# Patient Record
Sex: MALE | RURAL
[De-identification: names, ages, dates, MRNs, and addresses within clinical notes are randomized per-mention and may not be internally consistent; named-entity substitution may affect disease eponyms.]

---

## 2019-09-12 VITALS — HEIGHT: 73 IN

## 2019-09-12 RX ORDER — CITALOPRAM 20 MG/1
20 TABLET ORAL DAILY
COMMUNITY
End: 2019-11-22 | Stop reason: DRUGHIGH

## 2019-09-13 ENCOUNTER — OFFICE VISIT (OUTPATIENT)
Dept: CARDIOLOGY | Facility: CLINIC | Age: 29
End: 2019-09-13

## 2019-09-13 VITALS
HEIGHT: 73 IN | HEART RATE: 72 BPM | DIASTOLIC BLOOD PRESSURE: 88 MMHG | BODY MASS INDEX: 35.25 KG/M2 | WEIGHT: 266 LBS | SYSTOLIC BLOOD PRESSURE: 140 MMHG

## 2019-09-13 DIAGNOSIS — I48.0 PAROXYSMAL ATRIAL FIBRILLATION (HCC): Primary | ICD-10-CM

## 2019-09-13 PROBLEM — I48.91 ATRIAL FIBRILLATION (HCC): Status: ACTIVE | Noted: 2019-09-13

## 2019-09-13 PROBLEM — S62.002P: Status: ACTIVE | Noted: 2017-10-24

## 2019-09-13 PROBLEM — F41.9 ANXIETY: Status: ACTIVE | Noted: 2019-09-13

## 2019-09-13 PROCEDURE — 93000 ELECTROCARDIOGRAM COMPLETE: CPT | Performed by: INTERNAL MEDICINE

## 2019-09-13 PROCEDURE — 99204 OFFICE O/P NEW MOD 45 MIN: CPT | Performed by: INTERNAL MEDICINE

## 2019-09-13 RX ORDER — DICLOFENAC SODIUM 75 MG/1
TABLET, DELAYED RELEASE ORAL
COMMUNITY
Start: 2019-09-09 | End: 2019-11-22

## 2019-09-13 RX ORDER — OXYCODONE HYDROCHLORIDE AND ACETAMINOPHEN 5; 325 MG/1; MG/1
TABLET ORAL
COMMUNITY
Start: 2019-09-11 | End: 2019-11-22

## 2019-09-13 RX ORDER — KETOROLAC TROMETHAMINE 10 MG/1
TABLET, FILM COATED ORAL
COMMUNITY
Start: 2019-09-09 | End: 2019-11-22

## 2019-09-13 RX ORDER — ONDANSETRON 4 MG/1
TABLET, FILM COATED ORAL
COMMUNITY
Start: 2019-09-09 | End: 2019-11-22

## 2019-09-13 NOTE — PROGRESS NOTES
Subjective:     Encounter Date:09/13/2019      Patient ID: Toni Woodall is a 29 y.o. male.    Chief Complaint:  History of Present Illness    This is a 29-year-old with a history of anxiety, who presents for an evaluation of atrial fibrillation.    The patient was undergoing hip surgery on 9/11/2019 when he apparently developed bradycardia with heart rates in the 40s after hip cannula was placed intraoperatively.  He was given ephedrine at that point and his heart rates bob to the 80s was reportedly junctional rhythm with PACs.  Later on it was noted that he appeared to be in atrial fibrillation.  It is unclear what his heart rates were but it appears based on the anesthesia note that it ranged anywhere from the 100s to 130s.  He was given 2 doses of esmolol followed by IV metoprolol to 120s.  An EKG was performed that confirmed atrial fibrillation.  The patient was discharged postoperatively with a prescription for metoprolol  and plans for cardiology evaluation as an outpatient.      They ended up not giving him any of the metoprolol because when he got home that night his blood pressures were noted to be relatively low with systolics in the 100s.  Yesterday he reports that he had palpitations throughout the day they gave him an uncomfortable feeling in his chest but he denied any associated shortness of breath, near-syncope or syncope.  His blood pressures were also normal when they were checked yesterday.  Today he has not had any palpitations but reports brief episodes of chest tightness lasting a few seconds at a time this morning.  He currently does not have it the symptoms at this time.      He does report a history of anxiety and when he is feeling anxious develops shaking associated with heart racing.  Those symptoms feel different from what he was feeling yesterday.  He denies any history of diabetes or family history of heart disease in his immediate family.  He reports that he had some issues with  elevated blood pressures earlier in the year but this improved after he stopped drinking energy drinks.  He was started on citalopram earlier this year for his anxiety which helped for a couple of months but now his symptoms are back to where they were earlier.  He has not been back to seeing Dr. Maldonado for follow-up.  He does admit that his sleep schedule is off because he normally works swing shift.  Following his recent surgery he is to be off for the next 12 weeks.    Review of Systems   Constitution: Positive for malaise/fatigue. Negative for weakness.   HENT: Negative for hearing loss, hoarse voice, nosebleeds and sore throat.    Eyes: Negative for pain.   Cardiovascular: Positive for chest pain and palpitations. Negative for claudication, cyanosis, dyspnea on exertion, irregular heartbeat, leg swelling, near-syncope, orthopnea, paroxysmal nocturnal dyspnea and syncope.   Respiratory: Negative for shortness of breath and snoring.    Endocrine: Negative for cold intolerance, heat intolerance, polydipsia, polyphagia and polyuria.   Skin: Negative for itching and rash.   Musculoskeletal: Negative for arthritis, falls, joint pain, joint swelling, muscle cramps, muscle weakness and myalgias.   Gastrointestinal: Negative for constipation, diarrhea, dysphagia, heartburn, hematemesis, hematochezia, melena, nausea and vomiting.   Genitourinary: Negative for frequency, hematuria and hesitancy.   Neurological: Negative for excessive daytime sleepiness, dizziness, headaches, light-headedness and numbness.   Psychiatric/Behavioral: Negative for depression. The patient is nervous/anxious.           Current Outpatient Medications:   •  apixaban (ELIQUIS) 5 MG tablet tablet, Take 5 mg by mouth., Disp: , Rfl:   •  diclofenac (VOLTAREN) 75 MG EC tablet, , Disp: , Rfl:   •  ondansetron (ZOFRAN) 4 MG tablet, , Disp: , Rfl:   •  citalopram (CeleXA) 20 MG tablet, Take 20 mg by mouth Daily., Disp: , Rfl:   •  diclofenac (VOLTAREN)  "75 MG EC tablet, , Disp: , Rfl:   •  ketorolac (TORADOL) 10 MG tablet, , Disp: , Rfl:   •  metoprolol tartrate (LOPRESSOR) 25 MG tablet, , Disp: , Rfl:   •  oxyCODONE-acetaminophen (PERCOCET) 5-325 MG per tablet, , Disp: , Rfl:     Past Medical History:   Diagnosis Date   • Hip pain    • Scaphoid fracture      Past Surgical History:   Procedure Laterality Date   • NAIL BED REMOVAL/REVISION       History reviewed. No pertinent family history.  Social History     Tobacco Use   • Smoking status: Never Smoker   Substance Use Topics   • Alcohol use: No     Frequency: Never   • Drug use: No               ECG 12 Lead  Date/Time: 9/13/2019 2:10 PM  Performed by: Hattie Navas MD  Authorized by: Hattie Navas MD   Comparison: compared with previous ECG   Comparison to previous ECG: Atrial fibrillation resolved  Rhythm: sinus rhythm               Objective:         Visit Vitals  /88   Pulse 72   Ht 185.4 cm (73\")   Wt 121 kg (266 lb)   BMI 35.09 kg/m²          Physical Exam   Constitutional: He is oriented to person, place, and time. He appears well-developed and well-nourished.   HENT:   Head: Normocephalic and atraumatic.   Neck: No JVD present. Carotid bruit is not present.   Cardiovascular: Normal rate, regular rhythm, S1 normal and S2 normal. Exam reveals no gallop.   No murmur heard.  Pulses:       Radial pulses are 2+ on the right side, and 2+ on the left side.   No bilateral lower extremity edema   Pulmonary/Chest: Effort normal and breath sounds normal.   Abdominal: Soft. Normal appearance.   Neurological: He is alert and oriented to person, place, and time.   Skin: Skin is warm, dry and intact.   Psychiatric: He has a normal mood and affect.       Lab Review:       Assessment:          Diagnosis Plan   1. Paroxysmal atrial fibrillation (CMS/HCC)  Adult Transthoracic Echo Complete W/ Cont if Necessary Per Protocol          Plan:       1.  Paroxysmal atrial fibrillation.  He is in sinus rhythm today.  " Documented episode perioperatively.  It is unclear if his symptoms yesterday were due to recurrent episodes since the patient was still under the effects of the anesthesia when he had his initial episode.  It is unclear to me at this point if the atrial fibrillation was triggered by ephedrine use.  Although I suspect he is at risk for atrial fibrillation because of his swing shift schedule.  I explained atrial fibrillation and this issues associated with that in detail with the patient and his wife.  I recommended that we proceed with an echocardiogram to evaluate for any structural heart disease.  I would not start taking the metoprolol on a daily basis but instead recommended that he take it as needed for recurrent palpitations.  I would not like to wait and see if he has any recurrent palpitations before proceeding with a monitor.  His chads vasc score is 0 and I would not recommend anticoagulation at this point especially without knowing if he will continue to have episodes of atrial fibrillation.    We will call and discuss results of the echocardiogram.  We will tentatively plan on seeing the patient back again in 2 months.    Atrial Fibrillation and Atrial Flutter  Assessment  • The patient has paroxysmal atrial fibrillation  • The patient's CHADS2-VASc score is 0  • A [id number] score of 0 is considered a low risk for a thromboembolic event

## 2019-09-17 ENCOUNTER — TELEPHONE (OUTPATIENT)
Dept: CARDIOLOGY | Facility: CLINIC | Age: 29
End: 2019-09-17

## 2019-09-17 NOTE — TELEPHONE ENCOUNTER
Patient inquiring about scheduling Echo at [facility].    Please call him to schedule, order is in Epic.    [phone].    Thanks!

## 2019-09-17 NOTE — TELEPHONE ENCOUNTER
Called and went over unremarkable lab work.  Has not heard about scheduling echo yet.  Asked him to let us know if he does not hear anything this week.

## 2019-09-30 ENCOUNTER — TELEPHONE (OUTPATIENT)
Dept: CARDIOLOGY | Facility: CLINIC | Age: 29
End: 2019-09-30

## 2019-09-30 NOTE — TELEPHONE ENCOUNTER
PT STOPPED BY OFFICE TO  A DR NOTE. WE SEEN HIM ON 9/13 AND HE IS ON SHORT TERM  DIS SO NEEDS A NOTE EVERY VISIT. WE ARE TO SEE HIM AGAIN IN NOV

## 2019-11-22 ENCOUNTER — OFFICE VISIT (OUTPATIENT)
Dept: CARDIOLOGY | Facility: CLINIC | Age: 29
End: 2019-11-22

## 2019-11-22 VITALS
DIASTOLIC BLOOD PRESSURE: 78 MMHG | HEIGHT: 73 IN | WEIGHT: 273 LBS | HEART RATE: 63 BPM | SYSTOLIC BLOOD PRESSURE: 130 MMHG | BODY MASS INDEX: 36.18 KG/M2

## 2019-11-22 DIAGNOSIS — I48.0 PAROXYSMAL ATRIAL FIBRILLATION (HCC): Primary | ICD-10-CM

## 2019-11-22 PROCEDURE — 93000 ELECTROCARDIOGRAM COMPLETE: CPT | Performed by: INTERNAL MEDICINE

## 2019-11-22 PROCEDURE — 99213 OFFICE O/P EST LOW 20 MIN: CPT | Performed by: INTERNAL MEDICINE

## 2019-11-22 RX ORDER — CITALOPRAM 40 MG/1
40 TABLET ORAL DAILY
COMMUNITY
Start: 2019-11-16

## 2019-11-22 NOTE — PROGRESS NOTES
Subjective:     Encounter Date:11/22/2019      Patient ID: Toni Woodall is a 29 y.o. male.    Chief Complaint:  History of Present Illness    This is a 29-year-old with a history of anxiety, paroxysmal atrial fibrillation, who presents for follow up.      I saw the patient initially in 9/2019 when he presented for evaluation of atrial fibrillation. The patient was undergoing hip surgery on 9/11/2019 when he apparently developed bradycardia with heart rates in the 40s after hip cannula was placed intraoperatively.  He was given ephedrine at that point and his heart rates bob to the 80s was reportedly junctional rhythm with PACs.  Later on it was noted that he appeared to be in atrial fibrillation.  It is unclear what his heart rates were but it appears based on the anesthesia note that it ranged anywhere from the 100s to 130s.  He was given 2 doses of esmolol followed by IV metoprolol to 120s.  An EKG was performed that confirmed atrial fibrillation.  The patient was discharged postoperatively with a prescription for metoprolol  and plans for cardiology evaluation as an outpatient.  They ended up not starting the metoprolol because his blood pressures were noted to be relatively low.      At that office visit he reported some symptoms of palpitations the day before.  He also reported a history of anxiety and when he is feeling anxious develops shaking associated with heart racing.  Those symptoms feel different from what he felt the day prior.   He was started on citalopram earlier in the year for his anxiety which helped for a couple of months but now his symptoms are back to where they were earlier.   He does admit that his sleep schedule is off because he normally works swing shift.  Following his recent surgery he was to be off for 12 weeks.    At his initial office visit I recommended he remain off of metoprolol and apixaban since his CHADS-VASc score was 0.  We proceeded with an echocardiogram which was  performed on 9/24/2019 and was normal.      Today he presents for routine follow up.  He denies any recent palpitations, chest pain, dyspnea, near syncope or syncope or lower extremity edema.  He is nearing the end of his physical therapy and has follow up with orthopedic surgery in about a week.  He expects he will released back to work from their standpoint.     Review of Systems   Constitution: Negative for weakness and malaise/fatigue.   HENT: Negative for hearing loss, hoarse voice, nosebleeds and sore throat.    Eyes: Negative for pain.   Cardiovascular: Negative for chest pain, claudication, cyanosis, dyspnea on exertion, irregular heartbeat, leg swelling, near-syncope, orthopnea, palpitations, paroxysmal nocturnal dyspnea and syncope.   Respiratory: Negative for shortness of breath and snoring.    Endocrine: Negative for cold intolerance, heat intolerance, polydipsia, polyphagia and polyuria.   Skin: Negative for itching and rash.   Musculoskeletal: Negative for arthritis, falls, joint pain, joint swelling, muscle cramps, muscle weakness and myalgias.   Gastrointestinal: Negative for constipation, diarrhea, dysphagia, heartburn, hematemesis, hematochezia, melena, nausea and vomiting.   Genitourinary: Negative for frequency, hematuria and hesitancy.   Neurological: Negative for excessive daytime sleepiness, dizziness, headaches, light-headedness and numbness.   Psychiatric/Behavioral: Negative for depression. The patient is not nervous/anxious.          Current Outpatient Medications:   •  citalopram (CeleXA) 40 MG tablet, Take 40 mg by mouth Daily., Disp: , Rfl:     Past Medical History:   Diagnosis Date   • Hip pain    • Scaphoid fracture        Past Surgical History:   Procedure Laterality Date   • NAIL BED REMOVAL/REVISION         History reviewed. No pertinent family history.    Social History     Tobacco Use   • Smoking status: Never Smoker   Substance Use Topics   • Alcohol use: No     Frequency: Never  "  • Drug use: No         ECG 12 Lead  Date/Time: 11/22/2019 5:00 PM  Performed by: Hattie Navas MD  Authorized by: Hattie Navas MD   Comparison: compared with previous ECG   Similar to previous ECG  Rhythm: sinus rhythm               Objective:     Visit Vitals  /78   Pulse 63   Ht 185.4 cm (73\")   Wt 124 kg (273 lb)   BMI 36.02 kg/m²         Physical Exam   Constitutional: He is oriented to person, place, and time. He appears well-developed and well-nourished.   HENT:   Head: Normocephalic and atraumatic.   Neck: No JVD present. Carotid bruit is not present.   Cardiovascular: Normal rate, regular rhythm, S1 normal and S2 normal. Exam reveals no gallop.   No murmur heard.  Pulses:       Radial pulses are 2+ on the right side, and 2+ on the left side.   No bilateral lower extremity edema   Pulmonary/Chest: Effort normal and breath sounds normal.   Abdominal: Soft. Normal appearance.   Neurological: He is alert and oriented to person, place, and time.   Skin: Skin is warm, dry and intact.   Psychiatric: He has a normal mood and affect.       Lab Review:       Assessment:          Diagnosis Plan   1. Paroxysmal atrial fibrillation (CMS/HCC)            Plan:       1. Paroxysmal atrial fibrillation.  Isolated episode in the setting of surgery and ephedrine administration.  Suspect this was situational and isolated incident.  His echocardiogram did not indicate any structural issues that would place him at increased risk for recurrence.  Will continue to monitor for now.     Will see him back in 6 months.          "